# Patient Record
(demographics unavailable — no encounter records)

---

## 2025-02-14 NOTE — HISTORY OF PRESENT ILLNESS
[Father] : father [In nursery school] : In nursery school [Normal] : Normal [No] : No cigarette smoke exposure [Car seat in back seat] : Car seat in back seat [Carbon Monoxide Detectors] : Carbon monoxide detectors [Smoke Detectors] : Smoke detectors [Up to date] : Up to date [de-identified] : pizza, donuts. does not eat protein. Drinks [place] Milk with Vegetables. Will not eat foods for due to texture concerns.  [FreeTextEntry8] : potty training.  [FreeTextEntry9] : ALPHA AND OMEGA KIDS  [NO] : No [FreeTextEntry1] : Autism - ST, OT, ANIL being done in School - CPSE Evaluation coming up this summer.

## 2025-02-14 NOTE — HISTORY OF PRESENT ILLNESS
[Father] : father [In nursery school] : In nursery school [Normal] : Normal [No] : No cigarette smoke exposure [Car seat in back seat] : Car seat in back seat [Carbon Monoxide Detectors] : Carbon monoxide detectors [Smoke Detectors] : Smoke detectors [Up to date] : Up to date [de-identified] : pizza, donuts. does not eat protein. Drinks [place] Milk with Vegetables. Will not eat foods for due to texture concerns.  [FreeTextEntry8] : potty training.  [FreeTextEntry9] : ALPHA AND OMEGA KIDS  [NO] : No [FreeTextEntry1] : Autism - ST, OT, ANIL being done in School - CPSE Evaluation coming up this summer.

## 2025-02-14 NOTE — DEVELOPMENTAL MILESTONES
[Names at least one color] : names at least one color [Urinates in a potty or toilet] : does not urinate in a potty or toilet [FreeTextEntry1] : Starting to combine words.  using language to express his wants.  knows colors,  already starting to reading.  has language but not conversational.

## 2025-02-14 NOTE — PHYSICAL EXAM
[Alert] : alert [No Acute Distress] : no acute distress [Normocephalic] : normocephalic [Conjunctivae with no discharge] : conjunctivae with no discharge [Nonerythematous Oropharynx] : nonerythematous oropharynx [Clear to Auscultation Bilaterally] : clear to auscultation bilaterally [Regular Rate and Rhythm] : regular rate and rhythm [Normal S1, S2 present] : normal S1, S2 present [No Murmurs] : no murmurs [Soft] : soft [Biju 1] : Biju 1 [Normal Muscle Tone] : normal muscle tone [+2 Patella DTR] : +2 patella DTR

## 2025-02-14 NOTE — DISCUSSION/SUMMARY
[FreeTextEntry1] :  2.6 yo boy here for [facility].    [facility]  [facility] - [facility]  Reactive Airway diseaes - c/w Fluticasone daily - Albuterol PRN  [facility] - Appropriate growth for age - Continue offering balanced diet including fruits/vegetables and drinking mostly water - Brush teeth twice a day with soft toothbrush. Recommend visit to dentist. - Put toddler to sleep in own bed. Help toddler to maintain consistent daily routines and sleep schedule. - Read aloud to Toddler. - Limit screen time to max 1-2 hours per day.  - vaccines given today:UTD - Return  for 3 yr [facility]

## 2025-02-14 NOTE — DISCUSSION/SUMMARY
[FreeTextEntry1] :  2.4 yo boy here for [facility].    [facility]  [facility] - [facility]  Reactive Airway diseaes - c/w Fluticasone daily - Albuterol PRN  [facility] - Appropriate growth for age - Continue offering balanced diet including fruits/vegetables and drinking mostly water - Brush teeth twice a day with soft toothbrush. Recommend visit to dentist. - Put toddler to sleep in own bed. Help toddler to maintain consistent daily routines and sleep schedule. - Read aloud to Toddler. - Limit screen time to max 1-2 hours per day.  - vaccines given today:UTD - Return  for 3 yr [facility]

## 2025-03-28 NOTE — HISTORY OF PRESENT ILLNESS
[Fever] : FEVER [de-identified] : LABORED BREATHING AND FEVER.HAD ALBUTEROL TX AT 1550 (2HR PRIOR); RHINORRHEA AND COUGH STARTED LAST NIGHT, TODAY WITH FEVER, TREATED AT 5HR PRIOR WITH TYLENOL;

## 2025-03-28 NOTE — PHYSICAL EXAM
[Alert] : alert [Tired appearing] : tired appearing [Consolable] : consolable [Conjuctival Injection] : conjunctival injection [Clear Rhinorrhea] : clear rhinorrhea [Nasal Flaring] : nasal flaring [Erythematous Oropharynx] : erythematous oropharynx [Wheezing] : wheezing [Transmitted Upper Airway Sounds] : transmitted upper airway sounds [Tachypnea] : tachypnea [Subcostal Retractions] : subcostal retractions [Suprasternal Retractions] : suprasternal retractions [NL] : moves all extremities x4, warm, well perfused x4 [Erythema] : no erythema [Bulging] : not bulging [Purulent Effusion] : no purulent effusion [Vesicles] : no vesicles [Exudate] : no exudate [Rales] : no rales [FreeTextEntry7] : POST NEB TREATMENT WITH IMPROVED AERATION B/L AND RESOLVED WHEEZING L>R

## 2025-03-28 NOTE — HISTORY OF PRESENT ILLNESS
[Fever] : FEVER [de-identified] : LABORED BREATHING AND FEVER.HAD ALBUTEROL TX AT 1550 (2HR PRIOR); RHINORRHEA AND COUGH STARTED LAST NIGHT, TODAY WITH FEVER, TREATED AT 5HR PRIOR WITH TYLENOL;

## 2025-03-28 NOTE — REVIEW OF SYSTEMS
[Fever] : fever [Irritable] : irritability [Fussy] : fussy [Nasal Discharge] : nasal discharge [Nasal Congestion] : nasal congestion [Tachypnea] : tachypneic [Wheezing] : wheezing [Cough] : cough [Congestion] : congestion [Negative] : Genitourinary [Inconsolable] : consolable

## 2025-03-28 NOTE — PLAN
[TextEntry] : USE PRESCRIBED TREATMENT; ALBUTEROL NEB EVERY 4HR X48HRS, THEN DECREASE TO 6-8HRS AS TOLERATED; DISCUSSED WHEN SHOULD SEEK FURTHER CARE;

## 2025-05-08 NOTE — HISTORY OF PRESENT ILLNESS
[de-identified] : BUMPS ON FOREHEAD, ARMS, AND HANDS. ITCHY [FreeTextEntry6] : mother concerned about chicken pox

## 2025-05-08 NOTE — PHYSICAL EXAM
[NL] : moves all extremities x4, warm, well perfused x4 [de-identified] : random welts on lower arms, left dorsum of hand, left forehead, topped by clear dried crusty d/c; no tracking or tenderness

## 2025-05-08 NOTE — PHYSICAL EXAM
[NL] : moves all extremities x4, warm, well perfused x4 [de-identified] : random welts on lower arms, left dorsum of hand, left forehead, topped by clear dried crusty d/c; no tracking or tenderness

## 2025-05-08 NOTE — HISTORY OF PRESENT ILLNESS
[de-identified] : BUMPS ON FOREHEAD, ARMS, AND HANDS. ITCHY [FreeTextEntry6] : mother concerned about chicken pox

## 2025-06-19 NOTE — HISTORY OF PRESENT ILLNESS
[de-identified] : possible hand, foot, and mouth [FreeTextEntry6] : exposure to known case in school noticed swelling on side of left cheek, assume was a bug bite different rash developed on buttock, now on palms and soles, inside cheeks eating and drinking well, afebrile

## 2025-06-19 NOTE — PLAN
[TextEntry] : relatively mild case: no throat pain, afebrile supportive: rest, analgesics, fluids ro ff

## 2025-06-19 NOTE — HISTORY OF PRESENT ILLNESS
[de-identified] : possible hand, foot, and mouth [FreeTextEntry6] : exposure to known case in school noticed swelling on side of left cheek, assume was a bug bite different rash developed on buttock, now on palms and soles, inside cheeks eating and drinking well, afebrile

## 2025-06-19 NOTE — PHYSICAL EXAM
[NL] : warm, clear [de-identified] : ulcerative lesions on palms, soles, buttocks; stomatitic lesions on buccal areas bilaterally; pruritic papules on left preauricular region

## 2025-06-19 NOTE — PHYSICAL EXAM
[NL] : warm, clear [de-identified] : ulcerative lesions on palms, soles, buttocks; stomatitic lesions on buccal areas bilaterally; pruritic papules on left preauricular region